# Patient Record
Sex: MALE | Race: WHITE | HISPANIC OR LATINO | URBAN - METROPOLITAN AREA
[De-identification: names, ages, dates, MRNs, and addresses within clinical notes are randomized per-mention and may not be internally consistent; named-entity substitution may affect disease eponyms.]

---

## 2021-07-13 ENCOUNTER — TRANSFERRED RECORDS (OUTPATIENT)
Dept: HEALTH INFORMATION MANAGEMENT | Facility: CLINIC | Age: 61
End: 2021-07-13

## 2021-07-28 ENCOUNTER — TRANSFERRED RECORDS (OUTPATIENT)
Dept: HEALTH INFORMATION MANAGEMENT | Facility: CLINIC | Age: 61
End: 2021-07-28

## 2021-09-07 ENCOUNTER — TRANSFERRED RECORDS (OUTPATIENT)
Dept: HEALTH INFORMATION MANAGEMENT | Facility: CLINIC | Age: 61
End: 2021-09-07

## 2021-09-21 ENCOUNTER — TELEPHONE (OUTPATIENT)
Dept: PLASTIC SURGERY | Facility: CLINIC | Age: 61
End: 2021-09-21

## 2021-09-21 NOTE — TELEPHONE ENCOUNTER
I spoke with the patient today to reschedule his surgery with Dr. Monroe 9/30/21 from the [facility], which was out of network, to [facility]. I spoke with Alma Mahan and we were able to get the patient scheduled to still be able to coordinate his MOHS procedure with Dr. Epps. The patient was updated on the location and all questions were answered.

## 2021-09-30 ENCOUNTER — TRANSFERRED RECORDS (OUTPATIENT)
Dept: HEALTH INFORMATION MANAGEMENT | Facility: CLINIC | Age: 61
End: 2021-09-30

## 2021-10-07 ENCOUNTER — OFFICE VISIT (OUTPATIENT)
Dept: PLASTIC SURGERY | Facility: CLINIC | Age: 61
End: 2021-10-07
Payer: COMMERCIAL

## 2021-10-07 DIAGNOSIS — Z98.890 POSTOPERATIVE STATE: Primary | ICD-10-CM

## 2021-10-07 NOTE — LETTER
10/7/2021       RE: Jarod Shields  [address] [place] [address]     Dear Colleague,    Thank you for referring your patient, Jarod Shields, to the [facility] at [facility]. Please see a copy of my visit note below.    This office note has been dictated.       Again, thank you for allowing me to participate in the care of your patient.      Sincerely,    ELEAZAR BOOTH MD

## 2021-10-07 NOTE — LETTER
10/7/2021       RE: Jarod Shields  [address] [place] [address]     Dear Colleague,    Thank you for referring your patient, Jarod Shields, to the [facility] at [facility]. Please see a copy of my visit note below.    This office note has been dictated.     Service Date: 10/07/2021    REASON FOR VISIT: Mr. Shields is in today.      PHYSICAL EXAMINATION: Sutures are out.  The flap is healing nicely.      ASSESSMENT AND PLAN: We talked about potential dermabrasion in the areas of sebaceous skin.  He will check in with us in two months and we will make a decision at that time whether or not that is beneficial for him.      Abdifatah Monroe MD

## 2021-10-07 NOTE — LETTER
October 7, 2021  Re: Jarod MONAE Alyson  1960    Dear Dr. Epps,    Thank you so much for referring Jarod Shields to the [facility]. I had the pleasure of visiting with Jarod today.     Attached you will find a copy of my note. Please feel free to reach out to me with any questions, [phone].     This office note has been dictated.         Your trust in our practice and care is much appreciated.    Sincerely,  ELEAZAR BOOTH MD

## 2021-10-07 NOTE — LETTER
10/7/2021       RE: Jarod Shields  [address] [place] [address]     Dear Colleague,    Thank you for referring your patient, Jarod Shields, to the [facility] at [facility]. Please see a copy of my visit note below.    This office note has been dictated.     Service Date: 10/07/2021    REASON FOR VISIT: Mr. Shields is in today.      PHYSICAL EXAMINATION: Sutures are out.  The flap is healing nicely.      ASSESSMENT AND PLAN: We talked about potential dermabrasion in the areas of sebaceous skin.  He will check in with us in two months and we will make a decision at that time whether or not that is beneficial for him.      Abdifatah Monroe MD

## 2021-10-08 NOTE — PROGRESS NOTES
Service Date: 10/07/2021    REASON FOR VISIT: Mr. Shields is in today.      PHYSICAL EXAMINATION: Sutures are out.  The flap is healing nicely.      ASSESSMENT AND PLAN: We talked about potential dermabrasion in the areas of sebaceous skin.  He will check in with us in two months and we will make a decision at that time whether or not that is beneficial for him.      Abdifatah Monroe MD        D: 10/08/2021   T: 10/08/2021   MT: ms    Name:     SUSAN SHIELDS  MRN:      [id number]        Account:      [id number]   :      1960           Service Date: 10/07/2021       Document: [id number]

## 2021-12-09 ENCOUNTER — OFFICE VISIT (OUTPATIENT)
Dept: PLASTIC SURGERY | Facility: CLINIC | Age: 61
End: 2021-12-09
Payer: COMMERCIAL

## 2021-12-09 DIAGNOSIS — Z98.890 POSTOPERATIVE STATE: Primary | ICD-10-CM

## 2021-12-09 NOTE — LETTER
12/9/2021       RE: Jarod Shields  [address] [place] [address]     Dear Colleague,    Thank you for referring your patient, Jarod Shields, to the [facility] at [facility]. Please see a copy of my visit note below.    Service Date: 12/09/2021    REASON FOR VISIT: Jarod is in today.      PHYSICAL EXAMINATION: His scar has some depression as expected with the sebaceous skin.      ASSESSMENT AND PLAN: I talked with him about a spot dermabrasion and we would charge $100 for this.  The staff will make arrangements for when we can do that for him.  I think this could create a nice improvement for him.  We talked about the details and risks and benefits of surgery including scarring and sometimes the need for a secondary intervention.      Abdifatah Monroe MD

## 2021-12-09 NOTE — LETTER
12/9/2021       RE: Jarod Shields  [address] [place] [address]     Dear Colleague,    Thank you for referring your patient, Jarod Shields, to the [facility] at [facility]. Please see a copy of my visit note below.    This office note has been dictated.         Again, thank you for allowing me to participate in the care of your patient.      Sincerely,    ELEAZAR BOOTH MD

## 2021-12-09 NOTE — LETTER
12/9/2021       RE: Jarod Shields  [address] [place] [address]     Dear Colleague,    Thank you for referring your patient, Jarod Shields, to the [facility] at [facility]. Please see a copy of my visit note below.    Service Date: 12/09/2021    REASON FOR VISIT: Jarod is in today.      PHYSICAL EXAMINATION: His scar has some depression as expected with the sebaceous skin.      ASSESSMENT AND PLAN: I talked with him about a spot dermabrasion and we would charge $100 for this.  The staff will make arrangements for when we can do that for him.  I think this could create a nice improvement for him.  We talked about the details and risks and benefits of surgery including scarring and sometimes the need for a secondary intervention.      Abdifatah Monroe MD

## 2021-12-09 NOTE — LETTER
December 9, 2021  Re: Jarod MONAE Alyson  1960    Dear Dr. Epps,    Thank you so much for referring Jarod Shields to the [facility]. I had the pleasure of visiting with Jarod today.     Attached you will find a copy of my note. Please feel free to reach out to me with any questions, [phone].     This office note has been dictated.           Your trust in our practice and care is much appreciated.    Sincerely,  ELEAZAR BOOTH MD

## 2021-12-10 NOTE — PROGRESS NOTES
Service Date: 2021    REASON FOR VISIT: Jarod is in today.      PHYSICAL EXAMINATION: His scar has some depression as expected with the sebaceous skin.      ASSESSMENT AND PLAN: I talked with him about a spot dermabrasion and we would charge $100 for this.  The staff will make arrangements for when we can do that for him.  I think this could create a nice improvement for him.  We talked about the details and risks and benefits of surgery including scarring and sometimes the need for a secondary intervention.      Abdifatah Monroe MD        D: 2021   T: 12/10/2021   MT: ms    Name:     JAROD WOMACK  MRN:      [id number]        Account:      [id number]   :      1960           Service Date: 2021       Document: [id number]

## 2022-04-14 ENCOUNTER — OFFICE VISIT (OUTPATIENT)
Dept: PLASTIC SURGERY | Facility: CLINIC | Age: 62
End: 2022-04-14

## 2022-04-14 DIAGNOSIS — Z98.890 POSTOPERATIVE STATE: Primary | ICD-10-CM

## 2022-04-14 NOTE — LETTER
April 14, 2022  Re: Jarod MONAE Alyson  1960    Dear Dr. Epps,    Thank you so much for referring Jarod LETHA Haydernicolle to the [facility]. I had the pleasure of visiting with Jarod today.     Attached you will find a copy of my note. Please feel free to reach out to me with any questions, [phone].     This office note has been dictated.         Your trust in our practice and care is much appreciated.    Sincerely,  ELEAZAR BOOTH MD

## 2022-04-14 NOTE — LETTER
2022       RE: Jarod Shields  [address] [place] [address]     Dear Colleague,    Thank you for referring your patient, Jarod Shields, to the [facility] at [facility]. Please see a copy of my visit note below.    This office note has been dictated.     Service Date: 2022    HISTORY OF PRESENT ILLNESS: Jarod is in today. We talked about dermabrasion of the J-shaped scar from his nasal rotation flap. I reviewed the risks and benefits of it.     PROCEDURE: I injected Xylocaine and then Xylocaine with epinephrine. The area was dermabraded down to the level of punctate bleeding. Antibiotic ointment was applied. Home cares were discussed.     ASSESSMENT/PLAN: He will follow up with me in two weeks.       Abdifatah Monroe MD        D: 2022   T: 2022   MT: ms    Name:     JAROD SHIELDS  MRN:      [id number]        Account:      [id number]   :      1960           Service Date: 2022       Document: [id number]

## 2022-04-14 NOTE — LETTER
4/14/2022       RE: Jarod Shields  [address] [place] [address]     Dear Colleague,    Thank you for referring your patient, Jarod Shields, to the [facility] at [facility]. Please see a copy of my visit note below.    This office note has been dictated.       Again, thank you for allowing me to participate in the care of your patient.      Sincerely,    ELEAZAR BOOTH MD

## 2022-04-14 NOTE — LETTER
2022      RE: Jarod Shields  [address] [place] [address]       This office note has been dictated.     Service Date: 2022    HISTORY OF PRESENT ILLNESS: Jarod is in today. We talked about dermabrasion of the J-shaped scar from his nasal rotation flap. I reviewed the risks and benefits of it.     PROCEDURE: I injected Xylocaine and then Xylocaine with epinephrine. The area was dermabraded down to the level of punctate bleeding. Antibiotic ointment was applied. Home cares were discussed.     ASSESSMENT/PLAN: He will follow up with me in two weeks.       Abdifatah Monroe MD        D: 2022   T: 2022   MT: ms    Name:     JRAOD SHIELDS.  MRN:      [id number]        Account:      [id number]   :      1960           Service Date: 2022       Document: [id number]

## 2022-04-18 NOTE — PROGRESS NOTES
Service Date: 2022    HISTORY OF PRESENT ILLNESS: Jarod is in today. We talked about dermabrasion of the J-shaped scar from his nasal rotation flap. I reviewed the risks and benefits of it.     PROCEDURE: I injected Xylocaine and then Xylocaine with epinephrine. The area was dermabraded down to the level of punctate bleeding. Antibiotic ointment was applied. Home cares were discussed.     ASSESSMENT/PLAN: He will follow up with me in two weeks.       Abdifatah Monroe MD        D: 2022   T: 2022   MT: ms    Name:     JAROD WOMACK  MRN:      [id number]        Account:      [id number]   :      1960           Service Date: 2022       Document: [id number]

## 2022-04-27 ENCOUNTER — OFFICE VISIT (OUTPATIENT)
Dept: PLASTIC SURGERY | Facility: CLINIC | Age: 62
End: 2022-04-27
Payer: COMMERCIAL

## 2022-04-27 DIAGNOSIS — Z98.890 POSTOPERATIVE STATE: Primary | ICD-10-CM

## 2022-04-27 NOTE — LETTER
2022       RE: Jarod Shields  [address] [address]     Dear Colleague,    [facility] you [facility] Shield[facility]. Please see a copy of my visit note below.    This office note has been dictated.     Service Date: 2022    REASON FOR VISIT: Jarod is in today.      PHYSICAL EXAMINATION: The dermabrasion has been terrific.  I am very pleased.      ASSESSMENT AND PLAN: Photos were taken today.  He can follow up with us as needed.      Abdifatah Monroe MD        D: 2022   T: 2022   MT: ms    Name:     JAROD SHIELDS  MRN:      [id number]        Account:      [id number]   :      1960           Service Date: 2022       Document: [id number]

## 2022-04-27 NOTE — LETTER
2022       RE: Jarod Shields  [address] [place] [address]     Dear Colleague,    Thank you for referring your patient, Jarod Shields, to the \[facility] at [facility]. Please see a copy of my visit note below.    This office note has been dictated.     Service Date: 2022    REASON FOR VISIT: Jarod is in today.      PHYSICAL EXAMINATION: The dermabrasion has been terrific.  I am very pleased.      ASSESSMENT AND PLAN: Photos were taken today.  He can follow up with us as needed.      Abdifatah Monroe MD        D: 2022   T: 2022   MT: ms    Name:     JAROD SHIELDS  MRN:      [id number]        Account:      [id number]   :      1960           Service Date: 2022       Document: [id number]

## 2022-04-27 NOTE — LETTER
May 4, 2022  Re: Jarod Shields  1960    Dear Dr. Epps,    Thank you so much for referring Jarod Shields to the [facility]. I had the pleasure of visiting with Jarod today.     Attached you will find a copy of my note. Please feel free to reach out to me with any questions, [phone].     This office note has been dictated.     Service Date: 2022    REASON FOR VISIT: Jarod is in today.      PHYSICAL EXAMINATION: The dermabrasion has been terrific.  I am very pleased.      ASSESSMENT AND PLAN: Photos were taken today.  He can follow up with us as needed.      Eleazar Monroe MD        D: 2022   T: 2022   MT: ms    Name:     JAROD SHIELDS  MRN:      [id number]        Account:      [id number]   :      1960           Service Date: 2022       Document: [id number]        Your trust in our practice and care is much appreciated.    Sincerely,  ELEAZAR MONROE MD

## 2022-04-28 NOTE — PROGRESS NOTES
Service Date: 2022    REASON FOR VISIT: Jarod is in today.      PHYSICAL EXAMINATION: The dermabrasion has been terrific.  I am very pleased.      ASSESSMENT AND PLAN: Photos were taken today.  He can follow up with us as needed.      Abdifatah Monroe MD        D: 2022   T: 2022   MT: ms    Name:     JAROD WOMACK  MRN:      [id number]        Account:      [id number]   :      1960           Service Date: 2022       Document: [id number]